# Patient Record
Sex: FEMALE | Race: WHITE | NOT HISPANIC OR LATINO | Employment: OTHER | URBAN - METROPOLITAN AREA
[De-identification: names, ages, dates, MRNs, and addresses within clinical notes are randomized per-mention and may not be internally consistent; named-entity substitution may affect disease eponyms.]

---

## 2018-08-12 ENCOUNTER — HOSPITAL ENCOUNTER (EMERGENCY)
Facility: MEDICAL CENTER | Age: 63
End: 2018-08-12
Attending: EMERGENCY MEDICINE
Payer: COMMERCIAL

## 2018-08-12 VITALS
BODY MASS INDEX: 32.26 KG/M2 | HEART RATE: 59 BPM | SYSTOLIC BLOOD PRESSURE: 179 MMHG | RESPIRATION RATE: 16 BRPM | WEIGHT: 170.86 LBS | HEIGHT: 61 IN | OXYGEN SATURATION: 97 % | DIASTOLIC BLOOD PRESSURE: 84 MMHG | TEMPERATURE: 97.7 F

## 2018-08-12 DIAGNOSIS — N30.01 ACUTE CYSTITIS WITH HEMATURIA: ICD-10-CM

## 2018-08-12 DIAGNOSIS — I10 HYPERTENSION, UNSPECIFIED TYPE: ICD-10-CM

## 2018-08-12 LAB
APPEARANCE UR: CLEAR
BACTERIA #/AREA URNS HPF: ABNORMAL /HPF
BILIRUB UR QL STRIP.AUTO: NEGATIVE
COLOR UR: YELLOW
EPI CELLS #/AREA URNS HPF: ABNORMAL /HPF
GLUCOSE UR STRIP.AUTO-MCNC: NEGATIVE MG/DL
KETONES UR STRIP.AUTO-MCNC: NEGATIVE MG/DL
LEUKOCYTE ESTERASE UR QL STRIP.AUTO: ABNORMAL
MICRO URNS: ABNORMAL
NITRITE UR QL STRIP.AUTO: NEGATIVE
PH UR STRIP.AUTO: 6.5 [PH]
PROT UR QL STRIP: NEGATIVE MG/DL
RBC # URNS HPF: ABNORMAL /HPF
RBC UR QL AUTO: ABNORMAL
SP GR UR STRIP.AUTO: <=1.005
WBC #/AREA URNS HPF: ABNORMAL /HPF

## 2018-08-12 PROCEDURE — 87086 URINE CULTURE/COLONY COUNT: CPT

## 2018-08-12 PROCEDURE — 81001 URINALYSIS AUTO W/SCOPE: CPT

## 2018-08-12 PROCEDURE — 99284 EMERGENCY DEPT VISIT MOD MDM: CPT

## 2018-08-12 RX ORDER — PHENAZOPYRIDINE HYDROCHLORIDE 200 MG/1
200 TABLET, FILM COATED ORAL 3 TIMES DAILY PRN
Qty: 5 TAB | Refills: 0 | Status: SHIPPED | OUTPATIENT
Start: 2018-08-12

## 2018-08-12 RX ORDER — CEPHALEXIN 500 MG/1
500 CAPSULE ORAL 4 TIMES DAILY
Qty: 28 CAP | Refills: 0 | Status: SHIPPED | OUTPATIENT
Start: 2018-08-12 | End: 2018-08-19

## 2018-08-12 RX ORDER — IBUPROFEN 200 MG
600 TABLET ORAL EVERY 6 HOURS PRN
COMMUNITY

## 2018-08-12 RX ORDER — PHENAZOPYRIDINE HYDROCHLORIDE 200 MG/1
200 TABLET, FILM COATED ORAL ONCE
Status: DISCONTINUED | OUTPATIENT
Start: 2018-08-12 | End: 2018-08-12

## 2018-08-12 RX ORDER — CETIRIZINE HYDROCHLORIDE 10 MG/1
10 TABLET ORAL DAILY
COMMUNITY

## 2018-08-12 ASSESSMENT — PAIN SCALES - GENERAL: PAINLEVEL_OUTOF10: 6

## 2018-08-12 NOTE — ED PROVIDER NOTES
"CHIEF COMPLAINT  Chief Complaint   Patient presents with   • Urinary Pain       HPI  Sera Díaz is a 62 y.o. female who presents with dysuria since last night.  Much worse this morning.  Is able to urinate.  No associated vomiting, abdominal pain, back pain.  No fevers.  Has frequent urinary tract infections and is concerned that this may be another urinary tract infection.  Denies any skin changes or rashes.  Last urinary tract infection was in approximately January.  No recent antibiotic use in the past few months.  Has not taken any medications for her pain symptoms.  Denies any other chronic medical conditions.  Specifically, denies diabetes or steroid use -   No history of immunocompromised state.  The patient is visiting from out of town from [place].    REVIEW OF SYSTEMS  See HPI for further details. All other systems are negative.     PAST MEDICAL HISTORY   has a past medical history of Renal disorder.    SOCIAL HISTORY  Social History     Social History Main Topics   • Smoking status: Never Smoker   • Smokeless tobacco: Never Used   • Alcohol use No      Comment: occasional glass of wine   • Drug use: No   • Sexual activity: Not on file       SURGICAL HISTORY  patient denies any surgical history    CURRENT MEDICATIONS  Home Medications     Reviewed by Kena Burrell R.N. (Registered Nurse) on 08/12/18 at 0645  Med List Status: Not Addressed   Medication Last Dose Status        Patient Bassem Taking any Medications                       ALLERGIES  Allergies not on file    PHYSICAL EXAM  VITAL SIGNS: BP (!) 217/110   Pulse 64   Temp 36.5 °C (97.7 °F)   Resp 20   Ht 1.549 m (5' 1\")   Wt 77.5 kg (170 lb 13.7 oz)   SpO2 96%   BMI 32.28 kg/m²   Pulse ox interpretation: I interpret this pulse ox as normal.  Constitutional: Alert in no apparent distress.  HENT: No signs of trauma, Bilateral external ears normal, Nose normal.   Cardiovascular: Regular rate and rhythm.   Thorax & Lungs: No " respiratory distress  Abdomen: Bowel sounds normal, Soft, No tenderness, No masses, No pulsatile masses. No peritoneal signs.  Extremities: Intact distal pulses, No edema, No tenderness, No cyanosis  Neurologic: Alert, No focal deficits noted.       DIAGNOSTIC STUDIES / PROCEDURES    LABS  Labs Reviewed   URINALYSIS - Abnormal; Notable for the following:        Result Value    Leukocyte Esterase Moderate (*)     Occult Blood Moderate (*)     All other components within normal limits   URINE MICROSCOPIC (W/UA) - Abnormal; Notable for the following:     WBC 20-50 (*)     RBC 2-5 (*)     Bacteria Few (*)     All other components within normal limits   URINE CULTURE-EXISTING-LESS THAN 48 HOURS       COURSE & MEDICAL DECISION MAKING  Pertinent Labs & Imaging studies reviewed. (See chart for details)  62 y.o. female with a history of UTI presenting with dysuria for 1 day.  No associated vomiting, back pain, fevers.  No history of immunocompromise state or recent antibiotic use.  Urinalysis is consistent with urinary tract infection with moderate LE and significant WBC.  Trace hematuria as well.  Was offered Pyridium here for symptomatic management.  Recommending antibiotics over the next 7 days, Keflex.  Urine culture pending.  To follow-up with primary care physician for further management.  Patient does have hypertension here.  She states that she does not typically take medications for this.  She states that she believes she is hypertensive now due to discomfort and significant stressors in her life.  Asymptomatic hypertension at this moment.  Recommending follow-up with primary care physician for further management.  No abdominal pain or chest pain at this time.  Benign abdominal exam.    Patient's symptoms, benign physical examination, urinalysis are consistent with urinary tract infection.    The patient will return for worsening symptoms or failure of improvement and is stable at the time of discharge. The patient  "verbalizes understanding in their own words.    BP (!) 179/84   Pulse (!) 58   Temp 36.5 °C (97.7 °F)   Resp 16   Ht 1.549 m (5' 1\")   Wt 77.5 kg (170 lb 13.7 oz)   SpO2 97%   BMI 32.28 kg/m²     The patient was referred to primary care where they will receive further BP management.      [facility], Emergency Dept  [address]  [place] [id number] [phone]    As needed, If symptoms worsen    Primary care doctor    Schedule an appointment as soon as possible for a visit        FINAL IMPRESSION  1. Acute cystitis with hematuria    2. Hypertension, unspecified type            Electronically signed by: Juan Alberto Quiñones, 8/12/2018 6:37 AM  "

## 2018-08-12 NOTE — ED NOTES
Reviewed discharge instructions and prescriptions w/ pt, verbalized understanding to information provided including follow up care and return precautions, denied questions/concerns.  Pt assisted w/ directions to [facility] for prescriptions.  Pt ambulated from ED.

## 2018-08-14 LAB
BACTERIA UR CULT: NORMAL
SIGNIFICANT IND 70042: NORMAL
SITE SITE: NORMAL
SOURCE SOURCE: NORMAL